# Patient Record
Sex: FEMALE | Race: WHITE | NOT HISPANIC OR LATINO | URBAN - METROPOLITAN AREA
[De-identification: names, ages, dates, MRNs, and addresses within clinical notes are randomized per-mention and may not be internally consistent; named-entity substitution may affect disease eponyms.]

---

## 2022-05-09 ENCOUNTER — TRANSFERRED RECORDS (OUTPATIENT)
Dept: HEALTH INFORMATION MANAGEMENT | Facility: CLINIC | Age: 62
End: 2022-05-09

## 2022-05-12 ENCOUNTER — TRANSFERRED RECORDS (OUTPATIENT)
Dept: HEALTH INFORMATION MANAGEMENT | Facility: CLINIC | Age: 62
End: 2022-05-12

## 2022-05-17 ENCOUNTER — TRANSFERRED RECORDS (OUTPATIENT)
Dept: HEALTH INFORMATION MANAGEMENT | Facility: CLINIC | Age: 62
End: 2022-05-17

## 2022-08-09 ENCOUNTER — PATIENT OUTREACH (OUTPATIENT)
Dept: ONCOLOGY | Facility: CLINIC | Age: 62
End: 2022-08-09

## 2022-08-09 ENCOUNTER — TRANSCRIBE ORDERS (OUTPATIENT)
Dept: OTHER | Age: 62
End: 2022-08-09

## 2022-08-09 DIAGNOSIS — M79.89 MASS OF SOFT TISSUE OF THIGH: Primary | ICD-10-CM

## 2022-08-09 NOTE — PROGRESS NOTES
New Patient Oncology Nurse Navigator Note     Referring provider:   GIOVANNA Coughlin MD of [facility]      Referred to (specialty): Medical Oncology    Requested provider (if applicable):[facility] location     Date Referral Received: 8/9/2022     Evaluation for : met melanoma     Clinical History (per Nurse review of records provided):    **BOOK MARKED**   NOTES:  ~multiple specialists    IMAGING:  Reports in CE and Imaging requested to be pushed to PACS  6/20/2022:  PET CT  6/17/2022:  MR Brain    PATHOLOGY:  Done at [facility]  6/1/2022:  Amendment  6/14/2022 - Amendment to report [facility] Cutaneous Melanoma Targeted BRAF Next Generation Sequencing results. Please see diagnosis, attached scanned report, and biomarker synoptic.     7/25/2022 - Per Dr Whipple, the tissue was submitted to [facility] for [facility] testing; please see attached scanned report for results.   Final Diagnosis  A, B) SOFT TISSUE, RIGHT DISTAL THIGH POSTERIOR (A) AND RIGHT MID THIGH POSTERIOR (B), BIOPSIES:   1. Malignant melanoma, see comment   2. Ancillary testing:       a. NGS for BRAF: Negative; see attached report and biomarker synoptic    Amendment electronically signed by Damaris Murry MD on 7/25/2022 at  5:27 PM Amendment electronically signed by Damaris Murry MD on 6/14/2022 at 12:28 PM Electronically signed by Nakia Hirsch MD for Andrew Valerio MD on 6/6/2022 at  3:26 PM   Comment  A, B) These findings are consistent with soft tissue metastases.  Please correlate with clinical findings and any outside pathology to determine appropriate staging.       BRAF testing by NGS is ordered on specimen B and will be reported in an addendum.     Case seen in consultation with Dr. Hirsch.           6/21/2022:  Final Diagnosis  A) SKIN, RIGHT UPPER LATERAL ABDOMEN, BIOPSY:   1. Basal cell carcinoma, superficial and nodular types:      a. Perineural invasion: Absent      b. Margins: Positive     B) SKIN, RIGHT MID CLAVICLE,  BIOPSY:   1. Basal cell carcinoma, nodular type:      a. Perineural invasion: Absent      b. Margins: Positive     C) SKIN, LEFT MEDIAL CLAVICLE, BIOPSY:   1. Basal cell carcinoma, nodular type:      a. Perineural invasion: Absent      b. Margins: Positive         Clinical Assessment / Barriers to Care (Per Nurse): none noted       Records Location (Care Everywhere, Media, etc.): [facility]     Records Needed: Imaging pushed to PACS from [facility]     Additional testing needed prior to consult: none

## 2022-08-15 NOTE — TELEPHONE ENCOUNTER
Action 2022 3:45 PM ABT   Action Taken Slides from [facility] and taken to 5th floor path lab for review.    Images from [facility] received and resolved to PACS.       Action 2022 11:17 AM ABT   Action Taken Records from [facility] received and sent to HIM for upload     RECORDS STATUS - ALL OTHER DIAGNOSIS      RECORDS RECEIVED FROM: Janette Bourne   DATE RECEIVED: 22   NOTES STATUS DETAILS   OFFICE NOTE from referring provider Juan 22: Dr. Hany Coughlin   OFFICE NOTE from medical oncologist Juan 22: Dr. Bora Hutson   OFFICE NOTE from other specialist External: [facility] 22: Dr. Betsy Cheek   MEDICATION LIST [facility]    LABS     PATHOLOGY REPORTS Req 08/15 - [facility]  [employer] 22: [id number]     ANYTHING RELATED TO DIAGNOSIS [facility] Most recent 22   IMAGING (NEED IMAGES & REPORT)     MRI PACS 22: MR Thigh RT   US PACS 22: US Lower Extrem   PET PACS 22: PET CT Whole Body

## 2022-08-23 ENCOUNTER — LAB REQUISITION (OUTPATIENT)
Dept: LAB | Facility: CLINIC | Age: 62
End: 2022-08-23
Payer: COMMERCIAL

## 2022-08-23 PROCEDURE — 88321 CONSLTJ&REPRT SLD PREP ELSWR: CPT | Mod: GC | Performed by: PATHOLOGY

## 2022-08-25 ENCOUNTER — PRE VISIT (OUTPATIENT)
Dept: ONCOLOGY | Facility: CLINIC | Age: 62
End: 2022-08-25

## 2022-09-16 LAB
PATH REPORT.COMMENTS IMP SPEC: ABNORMAL
PATH REPORT.COMMENTS IMP SPEC: YES
PATH REPORT.FINAL DX SPEC: ABNORMAL
PATH REPORT.GROSS SPEC: ABNORMAL
PATH REPORT.MICROSCOPIC SPEC OTHER STN: ABNORMAL
PATH REPORT.RELEVANT HX SPEC: ABNORMAL
PATH REPORT.RELEVANT HX SPEC: ABNORMAL
PATH REPORT.SITE OF ORIGIN SPEC: ABNORMAL